# Patient Record
(demographics unavailable — no encounter records)

---

## 2024-11-22 NOTE — HISTORY OF PRESENT ILLNESS
[Parents] : parents [Normal] : Normal [Water heater temperature set at <120 degrees F] : Water heater temperature set at <120 degrees F [Car seat in back seat] : Car seat in back seat [Carbon Monoxide Detectors] : Carbon monoxide detectors [Smoke Detectors] : Smoke detectors [Fruit] : fruit [Vegetables] : vegetables [Meat] : meat [Cereal] : cereal [Eggs] : eggs [Table food] : table food [Cow's milk (Ounces per day ___)] : consumes [unfilled] oz of cow's milk per day [Bottle in bed] : Bottle in bed [Brushing teeth] : Brushing teeth [Playtime] : Playtime [No] : No cigarette smoke exposure [Up to date] : Up to date [FreeTextEntry7] : 15 months old M here for WCC [de-identified] : due for vaccines [FreeTextEntry1] : When given oatmeal -- pt vomit and one time had rash on abdomen and arms.

## 2024-11-22 NOTE — DEVELOPMENTAL MILESTONES
[Normal Development] : Normal Development [None] : none [Imitates scribbling] : imitates scribbling [Drinks from cup with little] : drinks from cup with little spilling [Points to ask for something] : points to ask for something or to get help [Uses 3 words other than names] : uses 3 words other than names [Speaks in sounds that seem like] : speaks in sounds that seem like an unknown language [Follows directions that do not] : follows direction that do not include a gesture [Looks when parent says,] : looks when parent says, "Where is...?" [Squats to  objects] : squats to  objects [Crawls up a few steps] : crawls up a few steps [Begins to run] : begins to run [Makes alfonso with crayon] : makes alfonso with nicoleyon [Drops object into and takes object] : drops object into and takes object out of container

## 2024-11-22 NOTE — PHYSICAL EXAM
no [Alert] : alert [No Acute Distress] : no acute distress [Normocephalic] : normocephalic [Anterior Clayton Closed] : anterior fontanelle closed [Red Reflex Bilateral] : red reflex bilateral [PERRL] : PERRL [Normally Placed Ears] : normally placed ears [Auricles Well Formed] : auricles well formed [Clear Tympanic membranes with present light reflex and bony landmarks] : clear tympanic membranes with present light reflex and bony landmarks [No Discharge] : no discharge [Nares Patent] : nares patent [Palate Intact] : palate intact [Uvula Midline] : uvula midline [Tooth Eruption] : tooth eruption  [Supple, full passive range of motion] : supple, full passive range of motion [No Palpable Masses] : no palpable masses [Symmetric Chest Rise] : symmetric chest rise [Clear to Auscultation Bilaterally] : clear to auscultation bilaterally [Regular Rate and Rhythm] : regular rate and rhythm [S1, S2 present] : S1, S2 present [No Murmurs] : no murmurs [+2 Femoral Pulses] : +2 femoral pulses [Soft] : soft [NonTender] : non tender [Non Distended] : non distended [Normoactive Bowel Sounds] : normoactive bowel sounds [No Hepatomegaly] : no hepatomegaly [No Splenomegaly] : no splenomegaly [Central Urethral Opening] : central urethral opening [Testicles Descended Bilaterally] : testicles descended bilaterally [Patent] : patent [Normally Placed] : normally placed [No Abnormal Lymph Nodes Palpated] : no abnormal lymph nodes palpated [No Clavicular Crepitus] : no clavicular crepitus [Negative Monson-Ortalani] : negative Monson-Ortalani [Symmetric Buttocks Creases] : symmetric buttocks creases [No Spinal Dimple] : no spinal dimple [NoTuft of Hair] : no tuft of hair [Cranial Nerves Grossly Intact] : cranial nerves grossly intact [No Rash or Lesions] : no rash or lesions [Hiram 1] : Hiram 1 [Uncircumcised] : uncircumcised

## 2024-11-22 NOTE — DISCUSSION/SUMMARY
[Normal Growth] : growth [Normal Development] : development [None] : No known medical problems [No Elimination Concerns] : elimination [No Feeding Concerns] : feeding [No Skin Concerns] : skin [Normal Sleep Pattern] : sleep [Add Food/Vitamin] : Add Food/Vitamin: [Communication and Social Development] : communication and social development [Sleep Routines and Issues] : sleep routines and issues [Temper Tantrums and Discipline] : temper tantrums and discipline [Healthy Teeth] : healthy teeth [Safety] : safety [No Medications] : ~He/She~ is not on any medications [Parent/Guardian] : parent/guardian [] : The components of the vaccine(s) to be administered today are listed in the plan of care. The disease(s) for which the vaccine(s) are intended to prevent and the risks have been discussed with the caretaker.  The risks are also included in the appropriate vaccination information statements which have been provided to the patient's caregiver.  The caregiver has given consent to vaccinate. [FreeTextEntry1] : 15 month old M  Continue whole cow's milk in a cup. Discussed discontinuing bottles. Continue table foods, 3 meals with 2-3 snacks per day. Incorporate fluorinated water daily. Brush teeth twice a day with soft toothbrush. Recommend visit to dentist. When in car, keep child in rear-facing car seats until age 2, or until  the maximum height and weight for seat is reached. Put baby to sleep in own crib. Lower crib mattress. Help baby to maintain consistent daily routines and sleep schedule. Recognize stranger and separation anxiety. Ensure home is safe since baby is increasingly mobile. Be within arm's reach of baby at all times. Use consistent, positive discipline. Read aloud to baby. Vaccines given today, SE, risks and benefits explained, cool compresses and Acetaminophen as needed.  Return for 18 months old well child check

## 2024-12-24 NOTE — HISTORY OF PRESENT ILLNESS
[FreeTextEntry6] :  Patient was well until 2 week  ago with rash started at the right hip area with diapers that are getting too tight on him.  Parents have gone up in diaper size but the rash has gotten worse. Applying Desitin and Eucerin lotion to area but not much improvement Pt is otherwise well. Patient is active, playful, normal appetite, urinating and stooling well no F/V/D/abd pain, no sore throat, no ear pain, no difficulty breathing no ill contact no recent travel

## 2024-12-24 NOTE — PHYSICAL EXAM
[NL] : warm, clear [Hiram: ____] : Hiram [unfilled] [Normal External Genitalia] : normal external genitalia [de-identified] : + erythematous with raise border with central clearing patch at right hip

## 2024-12-24 NOTE — DISCUSSION/SUMMARY
[FreeTextEntry1] : Advised to apply barrier cream with each diaper change and Nystatin ointment 2-3 times daily Keep diaper area clean and dry, use barrier cream, air ventilation to area RTC if s/s worsen

## 2025-02-19 NOTE — HISTORY OF PRESENT ILLNESS
[Cow's milk (Ounces per day ___)] : consumes [unfilled] oz of Cow's milk per day [Finger Foods] : finger foods [Table food] : table food [___ stools per day] : [unfilled]  stools per day [Firm] : firm consistency [Normal] : Normal [Brushing teeth] : Brushing teeth [Toothpaste] : Primary Fluoride Source: Toothpaste [Playtime] : Playtime  [No] : Not at  exposure [Up to date] : Up to date [Parents] : parents

## 2025-02-19 NOTE — DISCUSSION/SUMMARY
[Family Support] : family support [Child Development and Behavior] : child development and behavior [Language Promotion/Hearing] : language promotion/hearing [Toliet Training Readiness] : toliet training readiness [Safety] : safety [Mother] : mother [FreeTextEntry1] : Continue whole cow's milk. Continue table foods, 3 meals with 2-3 snacks per day. Incorporate flourinated water daily in a sippy cup. Brush teeth twice a day with soft toothbrush. Recommend visit to dentist. When in car, keep child in rear-facing car seats until age 2, or until  the maximum height and weight for seat is reached. Put todder to sleep in own bed or crib. Help toddler to maintain consistent daily routines and sleep schedule. Toilet training discussed. Recognize anxiety in new settings. Ensure home is safe. Be within arm's reach of toddler at all times. Use consistent, positive discipline. Read aloud to toddler.

## 2025-02-19 NOTE — DEVELOPMENTAL MILESTONES
[Engages with others for play] : engages with others for play [Help dress and undress self] : help dress and undress self [Points to pictures in book] : points to pictures in book [Points to object of interest to] : points to object of interest to draw attention to it [Turns and looks at adult if] : turns and looks at adult if something new happens [Begins to scoop with spoon] : begins to scoop with spoon [Uses 6 to 10 words other than] : uses 6 to 10 words other than names [Identifies at least 2 body parts] : identifies at least 2 body parts [Walks up with 2 feet per step] : walks up with 2 feet per step with hand held [Sits in small chair] : sits in small chair [Carries toy while walking] : carries toy while walking [Scribbles spontaneously] : scribbles spontaneously [Throws small ball a few feet] : throws a small ball a few feet while standing [Passed] : passed [Yes] : Completed. [Normal Development] : Normal Development [None] : none

## 2025-02-19 NOTE — PHYSICAL EXAM
[Alert] : alert [No Acute Distress] : no acute distress [Normocephalic] : normocephalic [Anterior Margie Closed] : anterior fontanelle closed [Red Reflex Bilateral] : red reflex bilateral [PERRL] : PERRL [Normally Placed Ears] : normally placed ears [Auricles Well Formed] : auricles well formed [Clear Tympanic membranes with present light reflex and bony landmarks] : clear tympanic membranes with present light reflex and bony landmarks [No Discharge] : no discharge [Nares Patent] : nares patent [Palate Intact] : palate intact [Uvula Midline] : uvula midline [Tooth Eruption] : tooth eruption  [Supple, full passive range of motion] : supple, full passive range of motion [No Palpable Masses] : no palpable masses [Symmetric Chest Rise] : symmetric chest rise [Clear to Auscultation Bilaterally] : clear to auscultation bilaterally [Regular Rate and Rhythm] : regular rate and rhythm [S1, S2 present] : S1, S2 present [No Murmurs] : no murmurs [+2 Femoral Pulses] : +2 femoral pulses [Soft] : soft [NonTender] : non tender [Non Distended] : non distended [Normoactive Bowel Sounds] : normoactive bowel sounds [No Hepatomegaly] : no hepatomegaly [No Splenomegaly] : no splenomegaly [Central Urethral Opening] : central urethral opening [Testicles Descended Bilaterally] : testicles descended bilaterally [Patent] : patent [Normally Placed] : normally placed [No Abnormal Lymph Nodes Palpated] : no abnormal lymph nodes palpated [No Clavicular Crepitus] : no clavicular crepitus [Symmetric Buttocks Creases] : symmetric buttocks creases [No Spinal Dimple] : no spinal dimple [NoTuft of Hair] : no tuft of hair [Cranial Nerves Grossly Intact] : cranial nerves grossly intact [No Rash or Lesions] : no rash or lesions

## 2025-02-19 NOTE — PHYSICAL EXAM
[Alert] : alert [No Acute Distress] : no acute distress [Normocephalic] : normocephalic [Anterior Lan[place] Closed] : anterior fontanelle closed [Red Reflex Bilateral] : red reflex bilateral [PERRL] : PERRL [Normally Placed Ears] : normally placed ears [Auricles Well Formed] : auricles well formed [Clear Tympanic membranes with present light reflex and bony landmarks] : clear tympanic membranes with present light reflex and bony landmarks [No Discharge] : no discharge [Nares Patent] : nares patent [Palate Intact] : palate intact [Uvula Midline] : uvula midline [Tooth Eruption] : tooth eruption  [Supple, full passive range of motion] : supple, full passive range of motion [No Palpable Masses] : no palpable masses [Symmetric Chest Rise] : symmetric chest rise [Clear to Auscultation Bilaterally] : clear to auscultation bilaterally [Regular Rate and Rhythm] : regular rate and rhythm [S1, S2 present] : S1, S2 present [No Murmurs] : no murmurs [+2 Femoral Pulses] : +2 femoral pulses [Soft] : soft [NonTender] : non tender [Non Distended] : non distended [Normoactive Bowel Sounds] : normoactive bowel sounds [No Hepatomegaly] : no hepatomegaly [No Splenomegaly] : no splenomegaly [Central Urethral Opening] : central urethral opening [Testicles Descended Bilaterally] : testicles descended bilaterally [Patent] : patent [Normally Placed] : normally placed [No Abnormal Lymph Nodes Palpated] : no abnormal lymph nodes palpated [No Clavicular Crepitus] : no clavicular crepitus [Symmetric Buttocks Creases] : symmetric buttocks creases [No Spinal Dimple] : no spinal dimple [NoTuft of Hair] : no tuft of hair [Cranial Nerves Grossly Intact] : cranial nerves grossly intact [No Rash or Lesions] : no rash or lesions

## 2025-06-26 NOTE — PHYSICAL EXAM
[Erythematous Oropharynx] : erythematous oropharynx [NL] : moves all extremities x4, warm, well perfused x4 [de-identified] : (+) Vesicles in posterior oropharynx [de-identified] : Papular rash over palms, soles, perioral region

## 2025-06-26 NOTE — BEGINNING OF VISIT
[Mother] : mother [] :  [Interpreters_IDNumber] : [id number] [Interpreters_FullName] : Best [TWNoteComboBox1] : French

## 2025-06-26 NOTE — DISCUSSION/SUMMARY
[FreeTextEntry1] : 22 month old male presenting with HFMD. Well appearing child on exam at this time, in no acute distress. Pulses intact, capillary refill less than 2 seconds.   -Diagnosis reviewed with mother, who became frustrated that no antibiotics will be prescribed for this condition during the encounter. Extensive discussed with mother HFMD is caused by a virus and antibiotics would not be indicated. Supportive care reviewed, including encouraging oral hydration.  -Advised to monitor UOP. Signs/symptoms that would warrant ER evaluation reviewed with mother

## 2025-06-26 NOTE — HISTORY OF PRESENT ILLNESS
[de-identified] : Fever for 3 days  [FreeTextEntry6] : 22 month old male presenting with fevers for 3 days. Associated with rash on his hands, feet, and around his mouth. Mother also reports he does not want to eat or drink anything.  No vomiting, diarrhea. No cough, congestion reported.  Takes sips of water mostly. Making wet diapers.

## 2025-07-30 NOTE — HISTORY OF PRESENT ILLNESS
[FreeTextEntry6] : Patient was well until 12 days ago with diarrhea, about 3 times a day. Stools with mucus and strong smell, no blood. Mother had given Imodium for 2 days --- advised not to give. ? abd pain when stooling. Today started to look more solids Patient is active, playful, normal appetite, urinating well no F/V/abd pain/rash, no sore throat, no ear pain, no difficulty breathing no ill contact + recent travel --- [facility] - drank some water when playing in the river 2 weeks ago.

## 2025-07-30 NOTE — PHYSICAL EXAM
[NL] : warm, clear [Tired appearing] : not tired appearing [Lethargic] : not lethargic [Consolable] : consolable [Playful] : playful [Toxic] : not toxic [Capillary Refill <2s] : capillary refill < 2s

## 2025-07-30 NOTE — DISCUSSION/SUMMARY
[FreeTextEntry1] : TIM is a 23 month old boy who has diarrhea - improving, likely due to viral etiology, well appearing on exam  Will send for stool cx, h/o travel. Ensure hydration Supportive care, fluids, fever management;  Return to clinic or to ER if persistent fever, ear pain, SOB, AMS, decreased PO intake/ UOP

## 2025-07-30 NOTE — HISTORY OF PRESENT ILLNESS
[FreeTextEntry6] : Patient was well until 12 days ago with diarrhea, about 3 times a day. Stools with mucus and strong smell, no blood. Mother had given Imodium for 2 days --- advised not to give. ? abd pain when stooling. Today started to look more solids Patient is active, playful, normal appetite, urinating well no F/V/abd pain/rash, no sore throat, no ear pain, no difficulty breathing no ill contact + recent travel --- [facility] - drank some water when playing in the river 2 weeks ago.